# Patient Record
Sex: MALE | URBAN - METROPOLITAN AREA
[De-identification: names, ages, dates, MRNs, and addresses within clinical notes are randomized per-mention and may not be internally consistent; named-entity substitution may affect disease eponyms.]

---

## 2020-08-18 ENCOUNTER — APPOINTMENT (RX ONLY)
Dept: URBAN - METROPOLITAN AREA CLINIC 158 | Facility: CLINIC | Age: 60
Setting detail: DERMATOLOGY
End: 2020-08-18

## 2020-08-18 DIAGNOSIS — I89.0 LYMPHEDEMA, NOT ELSEWHERE CLASSIFIED: ICD-10-CM | Status: INADEQUATELY CONTROLLED

## 2020-08-18 PROCEDURE — ? COUNSELING

## 2020-08-18 PROCEDURE — ? TREATMENT REGIMEN

## 2020-08-18 PROCEDURE — 99243 OFF/OP CNSLTJ NEW/EST LOW 30: CPT

## 2020-08-18 PROCEDURE — ? PRESCRIPTION

## 2020-08-18 RX ORDER — BETAMETHASONE DIPROPIONATE 0.5 MG/G
APPLY OINTMENT, AUGMENTED TOPICAL BID
Qty: 50 | Refills: 5 | Status: ERX | COMMUNITY
Start: 2020-08-18

## 2020-08-18 RX ORDER — PENTOXIFYLLINE 400 MG/1
1 TABLET, EXTENDED RELEASE ORAL TID
Qty: 90 | Refills: 2 | Status: ERX | COMMUNITY
Start: 2020-08-18

## 2020-08-18 RX ADMIN — BETAMETHASONE DIPROPIONATE APPLY: 0.5 OINTMENT, AUGMENTED TOPICAL at 00:00

## 2020-08-18 RX ADMIN — PENTOXIFYLLINE 1: 400 TABLET, EXTENDED RELEASE ORAL at 00:00

## 2020-08-18 ASSESSMENT — LOCATION SIMPLE DESCRIPTION DERM
LOCATION SIMPLE: RIGHT PRETIBIAL REGION
LOCATION SIMPLE: LEFT PRETIBIAL REGION

## 2020-08-18 ASSESSMENT — LOCATION DETAILED DESCRIPTION DERM
LOCATION DETAILED: LEFT DISTAL PRETIBIAL REGION
LOCATION DETAILED: RIGHT DISTAL PRETIBIAL REGION

## 2020-08-18 ASSESSMENT — SEVERITY ASSESSMENT: SEVERITY: MODERATE TO SEVERE

## 2020-08-18 ASSESSMENT — LOCATION ZONE DERM: LOCATION ZONE: LEG

## 2020-08-18 NOTE — HPI: BUMPS
Is This A New Presentation, Or A Follow-Up?: Bumps
How Severe Are Your Bumps?: severe
Have Your Bumps Been Treated?: not been treated
Additional History: Pt is currently seeing wound care for legs

## 2020-08-18 NOTE — PROCEDURE: TREATMENT REGIMEN
Detail Level: Zone
Initiate Treatment: Pentoxifylline 400mg TID\\nAugmented betamethasone ointment BID
Plan: Referring pt to [facility]. Paperwork signed and faxed today.